# Patient Record
Sex: MALE | Race: WHITE | Employment: UNEMPLOYED | URBAN - METROPOLITAN AREA
[De-identification: names, ages, dates, MRNs, and addresses within clinical notes are randomized per-mention and may not be internally consistent; named-entity substitution may affect disease eponyms.]

---

## 2021-08-27 ENCOUNTER — HOSPITAL ENCOUNTER (EMERGENCY)
Age: 26
Discharge: HOME | End: 2021-08-27
Attending: EMERGENCY MEDICINE
Payer: MEDICARE

## 2021-08-27 VITALS
RESPIRATION RATE: 15 BRPM | HEART RATE: 65 BPM | WEIGHT: 160 LBS | OXYGEN SATURATION: 100 % | DIASTOLIC BLOOD PRESSURE: 89 MMHG | SYSTOLIC BLOOD PRESSURE: 150 MMHG | TEMPERATURE: 98.4 F

## 2021-08-27 DIAGNOSIS — Z20.2 POSSIBLE EXPOSURE TO STD: Primary | ICD-10-CM

## 2021-08-27 LAB
BILIRUBIN URINE: NEGATIVE
COLOR: YELLOW
COMMENT UA: ABNORMAL
GLUCOSE URINE: NEGATIVE
KETONES, URINE: ABNORMAL
LEUKOCYTE ESTERASE, URINE: NEGATIVE
NITRITE, URINE: NEGATIVE
PH UA: 7 (ref 5–8)
PROTEIN UA: NEGATIVE
SPECIFIC GRAVITY UA: 1.01 (ref 1–1.03)
TURBIDITY: CLEAR
URINE HGB: NEGATIVE
UROBILINOGEN, URINE: NORMAL

## 2021-08-27 PROCEDURE — 81003 URINALYSIS AUTO W/O SCOPE: CPT

## 2021-08-27 PROCEDURE — 87591 N.GONORRHOEAE DNA AMP PROB: CPT

## 2021-08-27 PROCEDURE — 99282 EMERGENCY DEPT VISIT SF MDM: CPT

## 2021-08-27 PROCEDURE — 87491 CHLMYD TRACH DNA AMP PROBE: CPT

## 2021-08-27 NOTE — ED TRIAGE NOTES
Pt co possible exposure to STD. Pt respirations are even and unlabored, pt is oriented X 4, speaking in complete sentences, bed is in the lowest position, call light is within reach. Will continue to monitor.

## 2021-08-27 NOTE — ED PROVIDER NOTES
[facility]  Emergency Department  Faculty Attestation     I performed a history and physical examination of the patient and discussed management with the resident. I reviewed the residents note and agree with the documented findings and plan of care. Any areas of disagreement are noted on the chart. I was personally present for the key portions of any procedures. I have documented in the chart those procedures where I was not present during the key portions. I have reviewed the emergency nurses triage note. I agree with the chief complaint, past medical history, past surgical history, allergies, medications, social and family history as documented unless otherwise noted below. For Physician Assistant/ Nurse Practitioner cases/documentation I have personally evaluated this patient and have completed at least one if not all key elements of the E/M (history, physical exam, and MDM). Additional findings are as noted. Primary Care Physician:  No primary care provider on file. Screenings:  [unfilled]    CHIEF COMPLAINT       Chief Complaint   Patient presents with    Exposure to STD     no s/s       RECENT VITALS:   Temp: 98.4 °F (36.9 °C),  Pulse: 65, Resp: 15, BP: (!) 150/89    LABS:  Labs Reviewed   C.TRACHOMATIS N.GONORRHOEAE DNA, URINE   URINE RT REFLEX TO CULTURE       Radiology  No orders to display         Attending Physician Additional  Notes    Patient caught his partner having sex with a stranger. He does not have urethral discharge or dysuria. No rash. He is assuming that his partner will have STI though she also does not have any symptoms. He wants to be tested but not necessarily treated for STI. On exam is nontoxic afebrile vital signs are normal.  Skin is warm and dry.   Plan is urine for gonorrhea chlamydia, follow-up to PCP, go to health department in 3 to 4 weeks for blood work for HIV and syphilis, safe sex counseling and use of emani Emdonds.  Crystal Brooks MD, [facility]  Attending Emergency  Physician               Diamante Gonzales MD  08/27/21 0170

## 2021-08-27 NOTE — ED NOTES
The following labs were labeled with patient stickers & tubed to lab;    []Lavender   []On Ice  []Blue  []Green/ Yellow  []Green/ Black []On Ice  []Pink  []Red  []Yellow    []COVID-19 Swab []Rapid    [x]Urine Sample  []Pelvic Cultures    []Blood Cultures       Krysten Marks LPN  29/33/17 7903

## 2021-08-27 NOTE — ED PROVIDER NOTES
[facility]  ED  Emergency Department Encounter  EmergencyMedicine Resident     Pt Belia Gonzalez  MRN: [id number]  Ivetgfady 1995  Date of evaluation: 8/27/21  PCP:  No primary care provider on file. CHIEF COMPLAINT       Chief Complaint   Patient presents with    Exposure to STD     no s/s       HISTORY OF PRESENT ILLNESS  (Location/Symptom, Timing/Onset, Context/Setting, Quality, Duration, Modifying Factors, Severity.)    This patient was evaluated in the Emergency Department for symptoms described in the history of present illness. He/she was evaluated in the context of the global COVID-19 pandemic, which necessitated consideration that the patient might be at risk for infection with the SARS-CoV-2 virus that causes COVID-19. Institutional protocols and algorithms that pertain to the evaluation of patients at risk for COVID-19 are in a state of rapid change based on information released by regulatory bodies including the CDC and federal and state organizations. These policies and algorithms were followed during the patient's care in the ED. Manuel Tomas is a 27 y.o. male who presents to the ED today with concerns for possible STD exposure after finding his partner having sex with a stranger. Denies any symptoms at this time. Specifically denies rash, hematuria, dysuria, penile discharge, scrotal pain, abdominal pain. He is interested in being tested but not instantly treated for STD at this time. No other complaints or concerns. PAST MEDICAL / SURGICAL / SOCIAL / FAMILY HISTORY      has no past medical history on file. has no past surgical history on file.     Social History     Socioeconomic History    Marital status: Single     Spouse name: Not on file    Number of children: Not on file    Years of education: Not on file    Highest education level: Not on file   Occupational History    Not on file   Tobacco Use    Smoking status: Not on file   Substance and Sexual Activity    Alcohol use: Not on file    Drug use: Not on file    Sexual activity: Not on file   Other Topics Concern    Not on file   Social History Narrative    Not on file     Social Determinants of Health     Financial Resource Strain:     Difficulty of Paying Living Expenses:    Food Insecurity:     Worried About Running Out of Food in the Last Year:     [address] in the Last Year:    Transportation Needs:     Lack of Transportation (Medical):  Lack of Transportation (Non-Medical):    Physical Activity:     Days of Exercise per Week:     Minutes of Exercise per Session:    Stress:     Feeling of Stress :    Social Connections:     Frequency of Communication with Friends and Family:     Frequency of Social Gatherings with Friends and Family:     Attends Bahai Services:     Active Member of Clubs or Organizations:     Attends Club or Organization Meetings:     Marital Status:    Intimate Partner Violence:     Fear of Current or Ex-Partner:     Emotionally Abused:     Physically Abused:     Sexually Abused:        No family history on file. Allergies:  Patient has no allergy information on record. Home Medications:  Prior to Admission medications    Not on File       REVIEW OF SYSTEMS    (2-9 systems for level 4, 10 or more for level 5)      Review of Systems   Constitutional: Negative for chills, diaphoresis and fever. Respiratory: Negative for cough, shortness of breath and wheezing. Cardiovascular: Negative for chest pain. Gastrointestinal: Negative for abdominal pain, nausea and vomiting. Genitourinary: Negative for discharge, dysuria, genital sores, hematuria, penile pain, penile swelling, scrotal swelling and testicular pain. Neurological: Negative for dizziness, numbness and headaches.        PHYSICAL EXAM   (up to 7 for level 4, 8 or more for level 5)      INITIAL VITALS:   BP (!) 150/89   Pulse 65   Temp 98.4 °F (36.9 °C) (Oral)   Resp 15 Microscopic exam not performed based on chemical results unless requested in original order. IMPRESSION/MDM/EMERGENCY DEPARTMENT COURSE:  Patient came to emergency department, HPI and physical exam were conducted. All nursing notes were reviewed. 66-year-old male present emergency department for concern for exposure to STD after finding partner cheating with another person. No symptoms at this time. No other complaints or concerns. Plan for urinalysis with chlamydia/gonorrhea testing. Follow-up with [address] [place] [address] for further testing evaluation. Answered all questions and explained states after practices as well as importance of using condoms in the meantime if he is going to continue being sexually active with partner. Patient is agreeable. All questions answered. Return precautions provided. Discharged home. FINAL IMPRESSION      1. Possible exposure to STD          DISPOSITION / PLAN     DISPOSITION Decision To Discharge 08/27/2021 01:36:48 PM      PATIENT REFERRED TO:  [facility]  [address] [place] [address]  [phone]    As needed, If symptoms worsen    [facility] Dept. Gloria Resendiz [phone]          DISCHARGE MEDICATIONS:  There are no discharge medications for this patient.       Nory Alonzo DO  Emergency Medicine Resident    (Please note that portions of thisnote were completed with a voice recognition program.  Efforts were made to edit the dictations but occasionally words are mis-transcribed.)       Nory Alonzo DO  Resident  08/30/21 1398

## 2021-08-27 NOTE — ED NOTES
Bed: 45  Expected date:   Expected time:   Means of arrival:   Comments:  No bed     Ashleigh Hair RN  08/27/21 8560

## 2021-08-30 LAB
C. TRACHOMATIS DNA ,URINE: NEGATIVE
N. GONORRHOEAE DNA, URINE: NEGATIVE
SPECIMEN DESCRIPTION: NORMAL

## 2021-08-30 ASSESSMENT — ENCOUNTER SYMPTOMS
VOMITING: 0
ABDOMINAL PAIN: 0
SHORTNESS OF BREATH: 0
NAUSEA: 0
COUGH: 0
WHEEZING: 0

## 2025-08-26 ENCOUNTER — OFFICE VISIT (OUTPATIENT)
Age: 30
End: 2025-08-26

## 2025-08-26 VITALS
BODY MASS INDEX: 20.99 KG/M2 | DIASTOLIC BLOOD PRESSURE: 87 MMHG | RESPIRATION RATE: 18 BRPM | SYSTOLIC BLOOD PRESSURE: 135 MMHG | TEMPERATURE: 98.9 F | WEIGHT: 155 LBS | HEIGHT: 72 IN | HEART RATE: 77 BPM | OXYGEN SATURATION: 99 %

## 2025-08-26 DIAGNOSIS — J40 BRONCHITIS: Primary | ICD-10-CM

## 2025-08-26 RX ORDER — METHYLPREDNISOLONE 4 MG/1
TABLET ORAL
Qty: 1 KIT | Refills: 0 | Status: SHIPPED | OUTPATIENT
Start: 2025-08-26

## 2025-08-26 RX ORDER — METHYLPREDNISOLONE 4 MG/1
TABLET ORAL
Qty: 1 KIT | Refills: 0 | Status: SHIPPED | OUTPATIENT
Start: 2025-08-26 | End: 2025-08-26 | Stop reason: SDUPTHER

## 2025-08-26 RX ORDER — BENZONATATE 100 MG/1
100 CAPSULE ORAL 3 TIMES DAILY PRN
Qty: 30 CAPSULE | Refills: 0 | Status: SHIPPED | OUTPATIENT
Start: 2025-08-26

## 2025-08-26 RX ORDER — BENZONATATE 100 MG/1
100 CAPSULE ORAL 3 TIMES DAILY PRN
Qty: 30 CAPSULE | Refills: 0 | Status: SHIPPED | OUTPATIENT
Start: 2025-08-26 | End: 2025-08-26 | Stop reason: SDUPTHER

## 2025-08-26 ASSESSMENT — ENCOUNTER SYMPTOMS
CHEST TIGHTNESS: 0
RHINORRHEA: 0
SHORTNESS OF BREATH: 0
WHEEZING: 1
COUGH: 1